# Patient Record
Sex: FEMALE | Race: WHITE | NOT HISPANIC OR LATINO | Employment: OTHER | URBAN - METROPOLITAN AREA
[De-identification: names, ages, dates, MRNs, and addresses within clinical notes are randomized per-mention and may not be internally consistent; named-entity substitution may affect disease eponyms.]

---

## 2021-10-25 ENCOUNTER — TELEPHONE (OUTPATIENT)
Dept: OBGYN CLINIC | Facility: CLINIC | Age: 76
End: 2021-10-25

## 2021-10-25 DIAGNOSIS — M81.0 AGE-RELATED OSTEOPOROSIS WITHOUT CURRENT PATHOLOGICAL FRACTURE: Primary | ICD-10-CM

## 2021-10-28 ENCOUNTER — VBI (OUTPATIENT)
Dept: ADMINISTRATIVE | Facility: OTHER | Age: 76
End: 2021-10-28

## 2021-11-05 RX ORDER — RALOXIFENE HYDROCHLORIDE 60 MG/1
1 TABLET, FILM COATED ORAL DAILY
COMMUNITY
End: 2021-11-08 | Stop reason: ALTCHOICE

## 2021-11-05 RX ORDER — HYDROXYZINE PAMOATE 25 MG/1
1 CAPSULE ORAL DAILY PRN
COMMUNITY

## 2021-11-05 RX ORDER — ZOLEDRONIC ACID 5 MG/100ML
5 INJECTION, SOLUTION INTRAVENOUS
COMMUNITY

## 2021-11-06 PROBLEM — M81.0 AGE-RELATED OSTEOPOROSIS WITHOUT CURRENT PATHOLOGICAL FRACTURE: Status: ACTIVE | Noted: 2021-11-06

## 2021-11-08 ENCOUNTER — OFFICE VISIT (OUTPATIENT)
Dept: OBGYN CLINIC | Facility: CLINIC | Age: 76
End: 2021-11-08
Payer: COMMERCIAL

## 2021-11-08 VITALS
HEIGHT: 61 IN | DIASTOLIC BLOOD PRESSURE: 60 MMHG | BODY MASS INDEX: 23.9 KG/M2 | SYSTOLIC BLOOD PRESSURE: 130 MMHG | WEIGHT: 126.6 LBS

## 2021-11-08 DIAGNOSIS — Z80.3 FAMILY HISTORY OF BREAST CANCER IN SISTER: Primary | ICD-10-CM

## 2021-11-08 DIAGNOSIS — M81.0 AGE-RELATED OSTEOPOROSIS WITHOUT CURRENT PATHOLOGICAL FRACTURE: ICD-10-CM

## 2021-11-08 DIAGNOSIS — Z12.31 BREAST CANCER SCREENING BY MAMMOGRAM: ICD-10-CM

## 2021-11-08 PROCEDURE — 99214 OFFICE O/P EST MOD 30 MIN: CPT | Performed by: OBSTETRICS & GYNECOLOGY

## 2021-11-08 RX ORDER — ALBUTEROL SULFATE 90 UG/1
1 AEROSOL, METERED RESPIRATORY (INHALATION) AS NEEDED
COMMUNITY
Start: 2021-10-18

## 2021-11-08 RX ORDER — VALACYCLOVIR HYDROCHLORIDE 1 G/1
1 TABLET, FILM COATED ORAL AS NEEDED
COMMUNITY
Start: 2021-10-18 | End: 2022-10-19

## 2021-11-08 RX ORDER — CETIRIZINE HYDROCHLORIDE 10 MG/1
10 TABLET ORAL DAILY
COMMUNITY

## 2021-11-08 RX ORDER — BESIFLOXACIN 6 MG/ML
1 SUSPENSION OPHTHALMIC 4 TIMES DAILY
COMMUNITY
Start: 2021-10-26

## 2021-11-08 RX ORDER — BROMFENAC SODIUM 0.7 MG/ML
1 SOLUTION/ DROPS OPHTHALMIC DAILY
COMMUNITY
Start: 2021-10-26

## 2021-12-22 LAB
25(OH)D3+25(OH)D2 SERPL-MCNC: 32.6 NG/ML (ref 30–100)
CALCIUM SERPL-MCNC: 9.5 MG/DL (ref 8.7–10.3)
CREAT SERPL-MCNC: 0.67 MG/DL (ref 0.57–1)
SL AMB EGFR AFRICAN AMERICAN: 97 ML/MIN/1.73
SL AMB EGFR NON AFRICAN AMERICAN: 84 ML/MIN/1.73

## 2021-12-27 ENCOUNTER — TELEPHONE (OUTPATIENT)
Dept: OBGYN CLINIC | Facility: CLINIC | Age: 76
End: 2021-12-27

## 2021-12-29 ENCOUNTER — CLINICAL SUPPORT (OUTPATIENT)
Dept: OBGYN CLINIC | Facility: CLINIC | Age: 76
End: 2021-12-29
Payer: COMMERCIAL

## 2021-12-29 VITALS
WEIGHT: 126 LBS | HEIGHT: 61 IN | DIASTOLIC BLOOD PRESSURE: 64 MMHG | SYSTOLIC BLOOD PRESSURE: 104 MMHG | BODY MASS INDEX: 23.79 KG/M2

## 2021-12-29 DIAGNOSIS — M81.0 OSTEOPOROSIS, POST-MENOPAUSAL: Primary | ICD-10-CM

## 2021-12-29 PROCEDURE — 96365 THER/PROPH/DIAG IV INF INIT: CPT | Performed by: OBSTETRICS & GYNECOLOGY

## 2021-12-29 RX ORDER — ZOLEDRONIC ACID 5 MG/100ML
5 INJECTION, SOLUTION INTRAVENOUS ONCE
Status: COMPLETED | OUTPATIENT
Start: 2021-12-29 | End: 2021-12-29

## 2021-12-29 RX ADMIN — ZOLEDRONIC ACID 5 MG: 5 INJECTION, SOLUTION INTRAVENOUS at 09:27

## 2022-11-07 DIAGNOSIS — Z12.31 BREAST CANCER SCREENING BY MAMMOGRAM: ICD-10-CM

## 2022-11-07 DIAGNOSIS — Z80.3 FAMILY HISTORY OF BREAST CANCER IN SISTER: ICD-10-CM

## 2024-06-04 ENCOUNTER — TELEPHONE (OUTPATIENT)
Age: 79
End: 2024-06-04

## 2024-06-04 DIAGNOSIS — M81.0 AGE-RELATED OSTEOPOROSIS WITHOUT CURRENT PATHOLOGICAL FRACTURE: Primary | ICD-10-CM

## 2024-06-04 NOTE — TELEPHONE ENCOUNTER
Pt last visit was in 2021.    2 sessions of reclast infusion after last visit. Pt wants to know if Dr. Mooney wants her to go and do it once more prior to visit.

## 2024-06-05 NOTE — TELEPHONE ENCOUNTER
Please contact. Due for dexa prior to any additional Reclast, last dexa was in 2020. If she had additional since with PCP?, need that report. We can discuss further management at her August visit, after the dexa. Order placed, please get to pt.  Thanks.

## 2024-06-05 NOTE — TELEPHONE ENCOUNTER
Spoke with patient via telephone to make her aware of message per Dr. Mooney. Patient verbalizes understanding. Provided contact number for central schedule for patient to set up DXA.

## 2024-06-24 ENCOUNTER — HOSPITAL ENCOUNTER (OUTPATIENT)
Dept: BONE DENSITY | Facility: CLINIC | Age: 79
Discharge: HOME | End: 2024-06-24
Payer: COMMERCIAL

## 2024-06-24 VITALS — BODY MASS INDEX: 22.18 KG/M2 | WEIGHT: 120.5 LBS | HEIGHT: 62 IN

## 2024-06-24 DIAGNOSIS — M81.0 AGE-RELATED OSTEOPOROSIS WITHOUT CURRENT PATHOLOGICAL FRACTURE: ICD-10-CM

## 2024-06-24 PROCEDURE — 77080 DXA BONE DENSITY AXIAL: CPT

## 2024-08-19 PROBLEM — Z01.419 ENCOUNTER FOR GYNECOLOGICAL EXAMINATION (GENERAL) (ROUTINE) WITHOUT ABNORMAL FINDINGS: Status: ACTIVE | Noted: 2024-08-19

## 2024-08-19 NOTE — PROGRESS NOTES
Assessment/Plan:    Encounter for gynecological examination (general) (routine) without abnormal findings  Here for well check, last seen 2021.   No breast or gyn concerns.  Normal breast and pelvic exams.  Last pap 2012 neg; no further indicated  Mammo order given, last 10/2022 [facility] 2B  Colonoscopy 2013, will discuss with PCP any further screening.      Age-related osteoporosis without current pathological fracture - Reclast 12/2020, [facility] prior  Reviewed dexa 6/24/24 [facility] spine T-2.7, left hip T-1.0, left fem neck T-1.5 compared to 11/2020 dexa [facility] spine T-3.5; left hip T-1.4. s/p 2 Reclast, last 12/2021.     Interested in 3rd Reclast. Will check labs and then will contact with infusion orders.       Diagnoses and all orders for this visit:    Encounter for gynecological examination (general) (routine) without abnormal findings    Age-related osteoporosis without current pathological fracture - Reclast 12/2020, [facility] prior  -     Creatinine, serum; Future  -     Calcium; Future  -     Vitamin D 25 hydroxy; Future  -     Creatinine, serum  -     Calcium  -     Vitamin D 25 hydroxy    Family history of breast cancer in sister  -     Mammo screening bilateral w 3d & cad; Future          Subjective:      Patient ID: Norma Montes is a 78 y.o. female.    HPI Here for Medicare biannual breast and pelvic exams and to follow up with osteoporosis therapy.     The following portions of the patient's history were reviewed and updated as appropriate: She  has a past medical history of Asthma, GERD (gastroesophageal reflux disease), Heart valve disease (Mitral valve prolapse), Kidney stone, Kidney stones, Osteoporosis, Schatzki's ring, Skin cancer, and Varicella.  She   Patient Active Problem List    Diagnosis Date Noted    Encounter for gynecological examination (general) (routine) without abnormal findings 08/19/2024    Family history of breast cancer in sister 11/08/2021    Age-related osteoporosis without  "current pathological fracture - Reclast 12/2020, [facility] prior 11/06/2021     She  has a past surgical history that includes Breast biopsy; Cyst Removal; DXA procedure(historical) (11/27/2020); Mammo (historical) (Bilateral, 11/30/2020); and Colonoscopy (2013).  Her family history includes Breast cancer (age of onset: 57) in her sister; Heart failure in her mother; Hip fracture (age of onset: 92) in her mother; Multiple sclerosis in her brother; Osteoporosis in her mother.  She  reports that she has never smoked. She has never used smokeless tobacco. She reports that she does not currently use alcohol. She reports that she does not use drugs.  Current Outpatient Medications   Medication Sig Dispense Refill    albuterol (PROVENTIL HFA,VENTOLIN HFA) 90 mcg/act inhaler Inhale 1 puff as needed      valACYclovir (VALTREX) 1,000 mg tablet Take 1 g by mouth as needed      zoledronic acid (RECLAST) 5 mg/100 mL IV infusion (premix) Infuse 5 mg into a venous catheter every 6 (six) months       No current facility-administered medications for this visit.     She is allergic to sulfites - food allergy and sulfa antibiotics..    Review of Systems  No PMB, breast, bladder, bowel changes. No new persistent pain, bloating, early satiety or pelvic pressure      Objective:      /64 (BP Location: Left arm, Patient Position: Sitting, Cuff Size: Standard)   Ht 5' 1.5\" (1.562 m)   Wt 54.6 kg (120 lb 6.4 oz)   BMI 22.38 kg/m²          Physical Exam    General appearance: no distress, pleasant  Neck: thyroid without nodules or thyromegaly, no palpable adenopathy  Lymph nodes: no palpable adenopathy  Breasts: no masses, nodes or skin changes  Abdomen: soft, non tender, no palpable masses  Pelvic exam: normal atrophic external genitalia, urethral meatus normal, vagina atrophic without lesions, introital hyperemia, cervix atrophic without lesions, uterus small, non tender, no adnexal masses, non tender  Rectal exam: normal sphincter " tone, no masses, RV confirms above

## 2024-08-23 ENCOUNTER — ANNUAL EXAM (OUTPATIENT)
Dept: OBGYN CLINIC | Facility: CLINIC | Age: 79
End: 2024-08-23
Payer: COMMERCIAL

## 2024-08-23 VITALS
BODY MASS INDEX: 22.16 KG/M2 | DIASTOLIC BLOOD PRESSURE: 64 MMHG | SYSTOLIC BLOOD PRESSURE: 120 MMHG | HEIGHT: 62 IN | WEIGHT: 120.4 LBS

## 2024-08-23 DIAGNOSIS — M81.0 AGE-RELATED OSTEOPOROSIS WITHOUT CURRENT PATHOLOGICAL FRACTURE: ICD-10-CM

## 2024-08-23 DIAGNOSIS — Z01.419 ENCOUNTER FOR GYNECOLOGICAL EXAMINATION (GENERAL) (ROUTINE) WITHOUT ABNORMAL FINDINGS: Primary | ICD-10-CM

## 2024-08-23 DIAGNOSIS — Z80.3 FAMILY HISTORY OF BREAST CANCER IN SISTER: ICD-10-CM

## 2024-08-23 PROCEDURE — S0610 ANNUAL GYNECOLOGICAL EXAMINA: HCPCS | Performed by: OBSTETRICS & GYNECOLOGY

## 2024-08-23 PROCEDURE — 99213 OFFICE O/P EST LOW 20 MIN: CPT | Performed by: OBSTETRICS & GYNECOLOGY

## 2024-08-23 NOTE — LETTER
August 23, 2024     Misty Ogden, DO  [address]  [address]  [place] [address]    Patient: Norma Montes   YOB: 1945   Date of Visit: 8/23/2024       Dear Dr. Ogden:    Thank you for referring Norma Montes to me for evaluation. Below are my notes for this consultation.    If you have questions, please do not hesitate to call me. I look forward to following your patient along with you.         Sincerely,        Sarah Mooney MD        CC: No Recipients    Sarah Mooney MD  8/23/2024  3:26 PM  Sign when Signing Visit  Assessment/Plan:    Encounter for gynecological examination (general) (routine) without abnormal findings  Here for well check, last seen 2021.   No breast or gyn concerns.  Normal breast and pelvic exams.  Last pap 2012 neg; no further indicated  Mammo order given, last 10/2022 [facility] 2B  Colonoscopy 2013, will discuss with PCP any further screening.      Age-related osteoporosis without current pathological fracture - Reclast 12/2020, [facility] prior  Reviewed dexa 6/24/24 [facility] spine T-2.7, left hip T-1.0, left fem neck T-1.5 compared to 11/2020 dexa [facility] spine T-3.5; left hip T-1.4. s/p 2 Reclast, last 12/2021.     Interested in 3rd Reclast. Will check labs and then will contact with infusion orders.       Diagnoses and all orders for this visit:    Encounter for gynecological examination (general) (routine) without abnormal findings    Age-related osteoporosis without current pathological fracture - Reclast 12/2020, [facility] prior  -     Creatinine, serum; Future  -     Calcium; Future  -     Vitamin D 25 hydroxy; Future  -     Creatinine, serum  -     Calcium  -     Vitamin D 25 hydroxy    Family history of breast cancer in sister  -     Mammo screening bilateral w 3d & cad; Future          Subjective:      Patient ID: Norma Montes is a 78 y.o. female.    HPI Here for Medicare biannual breast and pelvic exams and to follow up with osteoporosis  therapy.     The following portions of the patient's history were reviewed and updated as appropriate: She  has a past medical history of Asthma, GERD (gastroesophageal reflux disease), Heart valve disease (Mitral valve prolapse), Kidney stone, Kidney stones, Osteoporosis, Schatzki's ring, Skin cancer, and Varicella.  She   Patient Active Problem List    Diagnosis Date Noted   • Encounter for gynecological examination (general) (routine) without abnormal findings 08/19/2024   • Family history of breast cancer in sister 11/08/2021   • Age-related osteoporosis without current pathological fracture - Reclast 12/2020, [facility] prior 11/06/2021     She  has a past surgical history that includes Breast biopsy; Cyst Removal; DXA procedure(historical) (11/27/2020); Mammo (historical) (Bilateral, 11/30/2020); and Colonoscopy (2013).  Her family history includes Breast cancer (age of onset: 57) in her sister; Heart failure in her mother; Hip fracture (age of onset: 92) in her mother; Multiple sclerosis in her brother; Osteoporosis in her mother.  She  reports that she has never smoked. She has never used smokeless tobacco. She reports that she does not currently use alcohol. She reports that she does not use drugs.  Current Outpatient Medications   Medication Sig Dispense Refill   • albuterol (PROVENTIL HFA,VENTOLIN HFA) 90 mcg/act inhaler Inhale 1 puff as needed     • valACYclovir (VALTREX) 1,000 mg tablet Take 1 g by mouth as needed     • zoledronic acid (RECLAST) 5 mg/100 mL IV infusion (premix) Infuse 5 mg into a venous catheter every 6 (six) months       No current facility-administered medications for this visit.     She is allergic to sulfites - food allergy and sulfa antibiotics..    Review of Systems  No PMB, breast, bladder, bowel changes. No new persistent pain, bloating, early satiety or pelvic pressure      Objective:      /64 (BP Location: Left arm, Patient Position: Sitting, Cuff Size: Standard)   Ht 5'  "1.5\" (1.562 m)   Wt 54.6 kg (120 lb 6.4 oz)   BMI 22.38 kg/m²          Physical Exam    General appearance: no distress, pleasant  Neck: thyroid without nodules or thyromegaly, no palpable adenopathy  Lymph nodes: no palpable adenopathy  Breasts: no masses, nodes or skin changes  Abdomen: soft, non tender, no palpable masses  Pelvic exam: normal atrophic external genitalia, urethral meatus normal, vagina atrophic without lesions, introital hyperemia, cervix atrophic without lesions, uterus small, non tender, no adnexal masses, non tender  Rectal exam: normal sphincter tone, no masses, RV confirms above    "

## 2024-08-23 NOTE — PATIENT INSTRUCTIONS
Return to office in one year unless having any problems such as breast changes, bleeding, new persistent pain, new progressive bloating, new problems eating (getting full to quickly) or new constant urinary pressure that does not resolve in one week.    Continue to strive for 1200 mg of calcium and 1000 IU of vitamin D daily in diet and supplements combined for your bone health.  You can only absorb 600 mg of calcium at a time. Avoid excess calcium as this may adversely effect your heart.  There are 400 mg of calcium in an 8 oz serving of dairy.  [place] milk has 600 mg of calcium in an 8 oz serving.

## 2024-08-23 NOTE — ASSESSMENT & PLAN NOTE
Here for well check, last seen 2021.   No breast or gyn concerns.  Normal breast and pelvic exams.  Last pap 2012 neg; no further indicated  Mammo order given, last 10/2022 [facility] 2B  Colonoscopy 2013, will discuss with PCP any further screening.

## 2024-08-23 NOTE — ASSESSMENT & PLAN NOTE
[facility], [facility] T-1.0, left fem neck T-1.5 compared to 11/2020 dexa [facility] spine T-3.5; left hip T-1.4. s/p 2 Reclast, last 12/2021.     Interested in 3rd Reclast. Will check labs and then will contact with infusion orders.

## 2024-09-04 LAB
25(OH)D3+25(OH)D2 SERPL-MCNC: 26.3 NG/ML (ref 30–100)
CALCIUM SERPL-MCNC: 9.4 MG/DL (ref 8.7–10.3)
CREAT SERPL-MCNC: 0.63 MG/DL (ref 0.57–1)
EGFR: 90 ML/MIN/1.73

## 2024-09-05 ENCOUNTER — TELEPHONE (OUTPATIENT)
Dept: OBGYN CLINIC | Facility: CLINIC | Age: 79
End: 2024-09-05

## 2024-09-05 NOTE — TELEPHONE ENCOUNTER
Please inform pt that Reclast labs are resulted and clerical team will reach out to schedule at [facility] infusion.   She should increase her vitamin D to [id number] IU daily as her levels are slightly low.   Thanks

## 2024-09-13 ENCOUNTER — HOSPITAL ENCOUNTER (OUTPATIENT)
Dept: INFUSION CENTER | Facility: HOSPITAL | Age: 79
End: 2024-09-13
Attending: OBSTETRICS & GYNECOLOGY
Payer: COMMERCIAL

## 2024-09-13 VITALS
OXYGEN SATURATION: 94 % | HEIGHT: 61 IN | BODY MASS INDEX: 23.03 KG/M2 | HEART RATE: 70 BPM | RESPIRATION RATE: 16 BRPM | DIASTOLIC BLOOD PRESSURE: 72 MMHG | WEIGHT: 122 LBS | TEMPERATURE: 98.2 F | SYSTOLIC BLOOD PRESSURE: 156 MMHG

## 2024-09-13 DIAGNOSIS — M81.0 AGE-RELATED OSTEOPOROSIS WITHOUT CURRENT PATHOLOGICAL FRACTURE: Primary | ICD-10-CM

## 2024-09-13 PROCEDURE — 96365 THER/PROPH/DIAG IV INF INIT: CPT

## 2024-09-13 RX ORDER — ZOLEDRONIC ACID 0.05 MG/ML
5 INJECTION, SOLUTION INTRAVENOUS ONCE
OUTPATIENT
Start: 2024-09-14

## 2024-09-13 RX ORDER — SODIUM CHLORIDE 9 MG/ML
20 INJECTION, SOLUTION INTRAVENOUS ONCE
OUTPATIENT
Start: 2024-09-14

## 2024-09-13 RX ORDER — SODIUM CHLORIDE 9 MG/ML
20 INJECTION, SOLUTION INTRAVENOUS ONCE
Status: COMPLETED | OUTPATIENT
Start: 2024-09-13 | End: 2024-09-13

## 2024-09-13 RX ORDER — ZOLEDRONIC ACID 0.05 MG/ML
5 INJECTION, SOLUTION INTRAVENOUS ONCE
Status: COMPLETED | OUTPATIENT
Start: 2024-09-13 | End: 2024-09-13

## 2024-09-13 RX ADMIN — ZOLEDRONIC ACID 5 MG: 0.05 INJECTION, SOLUTION INTRAVENOUS at 11:24

## 2024-09-13 RX ADMIN — SODIUM CHLORIDE 20 ML/HR: 9 INJECTION, SOLUTION INTRAVENOUS at 11:25

## 2024-09-13 NOTE — PROGRESS NOTES
..Norma Montes  tolerated treatment well with no complications.      Norma Montes will call for next appointment after f/u appointment with physician.      AVS printed and given to Norma Montes:  No (Declined by Norma Montes)

## 2024-09-18 PROBLEM — Z01.419 ENCOUNTER FOR GYNECOLOGICAL EXAMINATION (GENERAL) (ROUTINE) WITHOUT ABNORMAL FINDINGS: Status: RESOLVED | Noted: 2024-08-19 | Resolved: 2024-09-18

## 2025-02-24 ENCOUNTER — TELEPHONE (OUTPATIENT)
Dept: OBGYN CLINIC | Facility: CLINIC | Age: 80
End: 2025-02-24

## 2025-02-24 NOTE — TELEPHONE ENCOUNTER
Return call from Janusz, phil states she completed the infusion on 9/13/2025 at [facility].

## 2025-02-24 NOTE — TELEPHONE ENCOUNTER
Please call patient and inquire if still interested in Reclast ordered in the fall. Never scheduled for infusion after labs resulted in September.   Thanks